# Patient Record
Sex: FEMALE | Race: WHITE | Employment: FULL TIME | URBAN - METROPOLITAN AREA
[De-identification: names, ages, dates, MRNs, and addresses within clinical notes are randomized per-mention and may not be internally consistent; named-entity substitution may affect disease eponyms.]

---

## 2018-08-17 ENCOUNTER — HOSPITAL ENCOUNTER (OUTPATIENT)
Age: 43
Setting detail: SPECIMEN
Discharge: HOME | End: 2018-08-17
Payer: COMMERCIAL

## 2018-08-17 ENCOUNTER — HOSPITAL ENCOUNTER (OUTPATIENT)
Age: 43
Discharge: HOME | End: 2018-08-17
Payer: COMMERCIAL

## 2018-08-17 ENCOUNTER — OFFICE VISIT (OUTPATIENT)
Dept: FAMILY MEDICINE CLINIC | Age: 43
End: 2018-08-17
Payer: COMMERCIAL

## 2018-08-17 ENCOUNTER — HOSPITAL ENCOUNTER (OUTPATIENT)
Dept: GENERAL RADIOLOGY | Age: 43
Discharge: HOME | End: 2018-08-19
Payer: COMMERCIAL

## 2018-08-17 ENCOUNTER — HOSPITAL ENCOUNTER (OUTPATIENT)
Age: 43
Discharge: HOME | End: 2018-08-19
Payer: COMMERCIAL

## 2018-08-17 VITALS
TEMPERATURE: 97.4 F | OXYGEN SATURATION: 99 % | HEIGHT: 64 IN | HEART RATE: 68 BPM | BODY MASS INDEX: 38.74 KG/M2 | DIASTOLIC BLOOD PRESSURE: 72 MMHG | WEIGHT: 226.9 LBS | SYSTOLIC BLOOD PRESSURE: 124 MMHG

## 2018-08-17 DIAGNOSIS — Z11.4 ENCOUNTER FOR SCREENING FOR HIV: ICD-10-CM

## 2018-08-17 DIAGNOSIS — E03.9 HYPOTHYROIDISM, UNSPECIFIED TYPE: ICD-10-CM

## 2018-08-17 DIAGNOSIS — Z72.0 TOBACCO ABUSE: ICD-10-CM

## 2018-08-17 DIAGNOSIS — R03.0 ELEVATED BLOOD PRESSURE READING: ICD-10-CM

## 2018-08-17 DIAGNOSIS — R11.0 NAUSEA: ICD-10-CM

## 2018-08-17 DIAGNOSIS — Z12.4 SCREENING FOR CERVICAL CANCER: ICD-10-CM

## 2018-08-17 DIAGNOSIS — Z13.220 SCREENING FOR LIPID DISORDERS: ICD-10-CM

## 2018-08-17 DIAGNOSIS — R42 DIZZINESS: ICD-10-CM

## 2018-08-17 DIAGNOSIS — Z12.39 SCREENING FOR BREAST CANCER: ICD-10-CM

## 2018-08-17 DIAGNOSIS — R42 DIZZINESS: Primary | ICD-10-CM

## 2018-08-17 LAB
ABSOLUTE EOS #: 0.08 K/UL (ref 0–0.44)
ABSOLUTE IMMATURE GRANULOCYTE: 0.05 K/UL (ref 0–0.3)
ABSOLUTE LYMPH #: 3.83 K/UL (ref 1.1–3.7)
ABSOLUTE MONO #: 0.62 K/UL (ref 0.1–1.2)
ALBUMIN SERPL-MCNC: 4 G/DL (ref 3.5–5.2)
ALBUMIN/GLOBULIN RATIO: 1.4 (ref 1–2.5)
ALP BLD-CCNC: 76 U/L (ref 35–104)
ALT SERPL-CCNC: 50 U/L (ref 5–33)
ANION GAP SERPL CALCULATED.3IONS-SCNC: 13 MMOL/L (ref 9–17)
AST SERPL-CCNC: 27 U/L
BASOPHILS # BLD: 1 % (ref 0–2)
BASOPHILS ABSOLUTE: 0.04 K/UL (ref 0–0.2)
BILIRUB SERPL-MCNC: <0.1 MG/DL (ref 0.3–1.2)
BILIRUBIN, POC: NORMAL
BLOOD URINE, POC: NORMAL
BUN BLDV-MCNC: 10 MG/DL (ref 6–20)
BUN/CREAT BLD: ABNORMAL (ref 9–20)
CALCIUM SERPL-MCNC: 9 MG/DL (ref 8.6–10.4)
CHLORIDE BLD-SCNC: 106 MMOL/L (ref 98–107)
CHOLESTEROL/HDL RATIO: 3.9
CHOLESTEROL: 178 MG/DL
CLARITY, POC: CLEAR
CO2: 23 MMOL/L (ref 20–31)
COLOR, POC: YELLOW
CONTROL: PRESENT
CREAT SERPL-MCNC: 0.47 MG/DL (ref 0.5–0.9)
DIFFERENTIAL TYPE: ABNORMAL
EKG ATRIAL RATE: 65 BPM
EKG P AXIS: 13 DEGREES
EKG P-R INTERVAL: 158 MS
EKG Q-T INTERVAL: 410 MS
EKG QRS DURATION: 88 MS
EKG QTC CALCULATION (BAZETT): 426 MS
EKG R AXIS: 21 DEGREES
EKG T AXIS: 8 DEGREES
EKG VENTRICULAR RATE: 65 BPM
EOSINOPHILS RELATIVE PERCENT: 1 % (ref 1–4)
GFR AFRICAN AMERICAN: >60 ML/MIN
GFR NON-AFRICAN AMERICAN: >60 ML/MIN
GFR SERPL CREATININE-BSD FRML MDRD: ABNORMAL ML/MIN/{1.73_M2}
GFR SERPL CREATININE-BSD FRML MDRD: ABNORMAL ML/MIN/{1.73_M2}
GLUCOSE BLD-MCNC: 91 MG/DL (ref 70–99)
GLUCOSE URINE, POC: NORMAL
HCT VFR BLD CALC: 47 % (ref 36.3–47.1)
HDLC SERPL-MCNC: 46 MG/DL
HEMOGLOBIN: 14.8 G/DL (ref 11.9–15.1)
HIV AG/AB: NONREACTIVE
IMMATURE GRANULOCYTES: 1 %
KETONES, POC: NORMAL
LDL CHOLESTEROL: 111 MG/DL (ref 0–130)
LEUKOCYTE EST, POC: NORMAL
LYMPHOCYTES # BLD: 44 % (ref 24–43)
MCH RBC QN AUTO: 31 PG (ref 25.2–33.5)
MCHC RBC AUTO-ENTMCNC: 31.5 G/DL (ref 28.4–34.8)
MCV RBC AUTO: 98.3 FL (ref 82.6–102.9)
MONOCYTES # BLD: 7 % (ref 3–12)
NITRITE, POC: NORMAL
NRBC AUTOMATED: 0 PER 100 WBC
PDW BLD-RTO: 13.4 % (ref 11.8–14.4)
PH, POC: 6
PLATELET # BLD: 251 K/UL (ref 138–453)
PLATELET ESTIMATE: ABNORMAL
PMV BLD AUTO: 10.2 FL (ref 8.1–13.5)
POTASSIUM SERPL-SCNC: 4.4 MMOL/L (ref 3.7–5.3)
PREGNANCY TEST URINE, POC: NORMAL
PROTEIN, POC: NORMAL
RBC # BLD: 4.78 M/UL (ref 3.95–5.11)
RBC # BLD: ABNORMAL 10*6/UL
SEG NEUTROPHILS: 46 % (ref 36–65)
SEGMENTED NEUTROPHILS ABSOLUTE COUNT: 4.03 K/UL (ref 1.5–8.1)
SODIUM BLD-SCNC: 142 MMOL/L (ref 135–144)
SPECIFIC GRAVITY, POC: 1.02
THYROXINE, FREE: 0.9 NG/DL (ref 0.93–1.7)
TOTAL PROTEIN: 6.9 G/DL (ref 6.4–8.3)
TRIGL SERPL-MCNC: 106 MG/DL
TSH SERPL DL<=0.05 MIU/L-ACNC: 8.18 MIU/L (ref 0.3–5)
UROBILINOGEN, POC: 0.2
VLDLC SERPL CALC-MCNC: NORMAL MG/DL (ref 1–30)
WBC # BLD: 8.7 K/UL (ref 3.5–11.3)
WBC # BLD: ABNORMAL 10*3/UL

## 2018-08-17 PROCEDURE — 84443 ASSAY THYROID STIM HORMONE: CPT

## 2018-08-17 PROCEDURE — 85025 COMPLETE CBC W/AUTO DIFF WBC: CPT

## 2018-08-17 PROCEDURE — 36415 COLL VENOUS BLD VENIPUNCTURE: CPT

## 2018-08-17 PROCEDURE — 80053 COMPREHEN METABOLIC PANEL: CPT

## 2018-08-17 PROCEDURE — 81002 URINALYSIS NONAUTO W/O SCOPE: CPT | Performed by: PHYSICIAN ASSISTANT

## 2018-08-17 PROCEDURE — 81025 URINE PREGNANCY TEST: CPT | Performed by: PHYSICIAN ASSISTANT

## 2018-08-17 PROCEDURE — 93005 ELECTROCARDIOGRAM TRACING: CPT

## 2018-08-17 PROCEDURE — 84439 ASSAY OF FREE THYROXINE: CPT

## 2018-08-17 PROCEDURE — 99214 OFFICE O/P EST MOD 30 MIN: CPT | Performed by: PHYSICIAN ASSISTANT

## 2018-08-17 PROCEDURE — 71046 X-RAY EXAM CHEST 2 VIEWS: CPT

## 2018-08-17 PROCEDURE — 80061 LIPID PANEL: CPT

## 2018-08-17 PROCEDURE — 87389 HIV-1 AG W/HIV-1&-2 AB AG IA: CPT

## 2018-08-17 RX ORDER — MECLIZINE HCL 12.5 MG/1
12.5 TABLET ORAL 3 TIMES DAILY PRN
Qty: 15 TABLET | Refills: 0 | Status: SHIPPED | OUTPATIENT
Start: 2018-08-17 | End: 2018-08-27

## 2018-08-17 RX ORDER — ONDANSETRON 4 MG/1
4 TABLET, ORALLY DISINTEGRATING ORAL EVERY 8 HOURS PRN
Qty: 15 TABLET | Refills: 0 | Status: SHIPPED | OUTPATIENT
Start: 2018-08-17

## 2018-08-17 ASSESSMENT — ENCOUNTER SYMPTOMS
VOMITING: 0
CHEST TIGHTNESS: 0
VISUAL CHANGE: 0
ABDOMINAL PAIN: 0
DIARRHEA: 0
RHINORRHEA: 0
EYE ITCHING: 0
SHORTNESS OF BREATH: 0
NAUSEA: 0
SORE THROAT: 0
SINUS PRESSURE: 0
EYE DISCHARGE: 0
COUGH: 0

## 2018-08-17 ASSESSMENT — PATIENT HEALTH QUESTIONNAIRE - PHQ9
SUM OF ALL RESPONSES TO PHQ QUESTIONS 1-9: 0
SUM OF ALL RESPONSES TO PHQ QUESTIONS 1-9: 0
1. LITTLE INTEREST OR PLEASURE IN DOING THINGS: 0
2. FEELING DOWN, DEPRESSED OR HOPELESS: 0
SUM OF ALL RESPONSES TO PHQ9 QUESTIONS 1 & 2: 0

## 2018-08-17 NOTE — PROGRESS NOTES
[address]  [address] [address]  Dept: [phone]  Dept Fax: [phone]    Moreno Valente is a 38 y.o. female who presents today for her medical conditions/complaints as noted below. Moreno Valente is c/o of   Chief Complaint   Patient presents with    Dizziness    Nausea     Have you seen any other physician or provider since your last visit no    Have you had any other diagnostic tests since your last visit? yes - labs    Have you changed or stopped any medications since your last visit including any over-the-counter medicines, vitamins, or herbal medicines? no     Are you taking all your prescribed medications? Yes  If NO, why? -  N/A           Patient Self-Management Goal for this visit. What is your goal for your visit today? - results of labs to begin taking thyroid medication again   Barriers to success: none   Plan for overcoming my barriers: N/A      Confidence: 10/10   Date goal set: 12/15/15   Date expected to reach goal: 1day    Medical history Review  Past Medical, Family, and Social History reviewed and does contribute to the patient presenting condition    Health Maintenance Due   Topic Date Due    DTaP/Tdap/Td vaccine (1 - Tdap) 10/25/1992    Pneumococcal med risk (1 of 1 - PPSV23) 10/25/1992    Cervical cancer screen  10/25/1994         HPI:     Fatigue   This is a chronic problem. The current episode started more than 1 month ago. The problem occurs constantly. The problem has been gradually worsening. Associated symptoms include fatigue. Pertinent negatives include no abdominal pain, anorexia, arthralgias, change in bowel habit, chest pain, chills, congestion, coughing, diaphoresis, fever, headaches, joint swelling, myalgias, nausea, neck pain, numbness, rash, sore throat, swollen glands, urinary symptoms, vertigo, visual change, vomiting or weakness. Dizziness   This is a new problem.  The current episode started normal.   Left Ear: External ear normal.   Nose: Nose normal.   Mouth/Throat: Oropharynx is clear and moist.   Eyes: Pupils are equal, round, and reactive to light. Conjunctivae and EOM are normal. Right eye exhibits no discharge. Left eye exhibits no discharge. No scleral icterus. Neck: Normal range of motion. Neck supple. No tracheal deviation present. No thyromegaly present. Cardiovascular: Normal rate, regular rhythm and normal heart sounds. Exam reveals no gallop and no friction rub. No murmur heard. Pulmonary/Chest: Effort normal and breath sounds normal. No stridor. No respiratory distress. She has no wheezes. She has no rales. She exhibits no tenderness. Abdominal: Soft. Bowel sounds are normal. She exhibits no distension. There is no tenderness. There is no rebound and no guarding. Musculoskeletal: She exhibits no edema. Neurological: She is alert and oriented to person, place, and time. She displays normal reflexes. No cranial nerve deficit. She exhibits normal muscle tone. Coordination and gait normal.   Skin: Skin is warm and dry. No rash noted. She is not diaphoretic. Psychiatric: She has a normal mood and affect. Her affect is not inappropriate. Nursing note and vitals reviewed. /72   Pulse 68   Temp 97.4 °F (36.3 °C) (Oral)   Ht 5' 4\" (1.626 m)   Wt 226 lb 14.4 oz (102.9 kg)   LMP 08/04/2018 (Exact Date)   SpO2 99%   Breastfeeding? No   BMI 38.95 kg/m²     Assessment:       Diagnosis Orders   1. Dizziness  CBC Auto Differential    Comprehensive Metabolic Panel    POCT Urinalysis no Micro    POCT urine pregnancy    EKG 12 Lead    Troponin   2. Hypothyroidism, unspecified type  TSH    T4, Free   3. Elevated blood pressure reading  CBC Auto Differential    Comprehensive Metabolic Panel    MRI BRAIN WO CONTRAST    EKG 12 Lead   4. BMI 38.0-38.9,adult  CBC Auto Differential    Comprehensive Metabolic Panel    Lipid Panel    TSH    T4, Free   5.  Tobacco abuse  XR CHEST Urinalysis no Micro    POCT urine pregnancy    EKG 12 Lead     Standing Status:   Future     Number of Occurrences:   1     Standing Expiration Date:   8/17/2019     Order Specific Question:   Reason for Exam?     Answer:   Dizziness     Orders Placed This Encounter   Medications    ondansetron (ZOFRAN ODT) 4 MG disintegrating tablet     Sig: Take 1 tablet by mouth every 8 hours as needed for Nausea or Vomiting     Dispense:  15 tablet     Refill:  0    meclizine (ANTIVERT) 12.5 MG tablet     Sig: Take 1 tablet by mouth 3 times daily as needed for Dizziness or Nausea     Dispense:  15 tablet     Refill:  0      Dizziness - Came home from work. Turned home fast and became dizzy - improvning. Slight HA. Some nausea. No Vision changes, N/T/W into extremities. No localized abd pain, diarrhea or constiopation with nausea. Has not vomited. No CP or SOB. Start work up. ED if sx worsen or fail to improve. Elevated BP - No h/o. Will repeat next visit. Hypothyroidism - No meds in 1.5 yrs. Will check labs. Previously on synthroid 150 mcg. Tobacco abuse - Pt understands risks and was encouraged to stop. Pt refusing. Cxr ordered. Endy ordered. No family hx breast cancer. Pt refusing flu and pneumonia. Pt understands benefit. Diet and exercise encouraged. Pt states she had lost a lot of wt (80 lbs) previously. MV encouraged. Schedule f/u for PAP. Mom dx with colon ca dx age 58. Pt without blood in stool, abd pain, or change in bowel habitus. Patient given educational materials - see patient instructions. Discussed use, benefit, and side effects of prescribed medications. All patient questions answered. Pt voiced understanding. Reviewed health maintenance. Instructed to continue current medications, diet and exercise. Patient agreed with treatment plan. Follow up as directed.      Electronically signed by Tanner Franks PA-C on 8/22/2018 at 3:05 PM

## 2018-08-18 DIAGNOSIS — R03.0 ELEVATED BLOOD PRESSURE READING: ICD-10-CM

## 2018-08-18 DIAGNOSIS — R42 DIZZINESS: ICD-10-CM

## 2018-08-18 LAB
CULTURE: NORMAL
Lab: NORMAL
SPECIMEN DESCRIPTION: NORMAL
STATUS: NORMAL

## 2018-08-20 ENCOUNTER — TELEPHONE (OUTPATIENT)
Dept: FAMILY MEDICINE CLINIC | Age: 43
End: 2018-08-20

## 2018-08-21 NOTE — TELEPHONE ENCOUNTER
I called pt and she was advised. Pt needs to know if she needs make another appt for FMLA since she was just here? Because her work is giving her a hard time. Pt stated she didn't go to work yesterday. Pt has not heard from cardio. Referral faxed to Dr Sandy Agagrwal. Pt does not have an referral for neuro. Please advise. Thank you.

## 2018-08-22 ASSESSMENT — ENCOUNTER SYMPTOMS
CHANGE IN BOWEL HABIT: 0
SWOLLEN GLANDS: 0

## 2018-08-28 ENCOUNTER — HOSPITAL ENCOUNTER (OUTPATIENT)
Dept: MRI IMAGING | Age: 43
Discharge: HOME | End: 2018-08-30
Payer: COMMERCIAL

## 2018-08-28 ENCOUNTER — HOSPITAL ENCOUNTER (OUTPATIENT)
Dept: MAMMOGRAPHY | Age: 43
Discharge: HOME | End: 2018-08-30
Payer: COMMERCIAL

## 2018-08-28 DIAGNOSIS — R03.0 ELEVATED BLOOD PRESSURE READING: ICD-10-CM

## 2018-08-28 DIAGNOSIS — Z12.39 SCREENING FOR BREAST CANCER: ICD-10-CM

## 2018-08-28 PROCEDURE — 70551 MRI BRAIN STEM W/O DYE: CPT

## 2018-08-28 PROCEDURE — 77067 SCR MAMMO BI INCL CAD: CPT
